# Patient Record
Sex: FEMALE | Race: WHITE | NOT HISPANIC OR LATINO | Employment: OTHER | URBAN - METROPOLITAN AREA
[De-identification: names, ages, dates, MRNs, and addresses within clinical notes are randomized per-mention and may not be internally consistent; named-entity substitution may affect disease eponyms.]

---

## 2018-08-17 PROBLEM — I20.9 ANGINA, CLASS III: Status: ACTIVE | Noted: 2018-08-17

## 2018-08-17 PROBLEM — R94.39 ABNORMAL MYOCARDIAL PERFUSION STUDY: Status: ACTIVE | Noted: 2018-08-17

## 2022-05-23 PROBLEM — I25.10 CAD (CORONARY ARTERY DISEASE): Status: ACTIVE | Noted: 2022-05-23

## 2022-05-23 PROBLEM — E11.9 DM TYPE 2 (DIABETES MELLITUS, TYPE 2): Status: ACTIVE | Noted: 2022-05-23

## 2022-05-23 PROBLEM — E78.5 DYSLIPIDEMIA: Status: ACTIVE | Noted: 2022-05-23

## 2022-05-23 PROBLEM — R93.1 ELEVATED CORONARY ARTERY CALCIUM SCORE: Status: ACTIVE | Noted: 2022-05-23

## 2023-12-01 ENCOUNTER — TELEPHONE (OUTPATIENT)
Dept: UROGYNECOLOGY | Facility: CLINIC | Age: 63
End: 2023-12-01
Payer: COMMERCIAL

## 2024-01-11 ENCOUNTER — PATIENT MESSAGE (OUTPATIENT)
Dept: UROGYNECOLOGY | Facility: CLINIC | Age: 64
End: 2024-01-11

## 2024-01-11 ENCOUNTER — OFFICE VISIT (OUTPATIENT)
Dept: UROGYNECOLOGY | Facility: CLINIC | Age: 64
End: 2024-01-11
Payer: MEDICARE

## 2024-01-11 VITALS
WEIGHT: 149.06 LBS | DIASTOLIC BLOOD PRESSURE: 62 MMHG | BODY MASS INDEX: 23.34 KG/M2 | SYSTOLIC BLOOD PRESSURE: 124 MMHG

## 2024-01-11 DIAGNOSIS — N81.11 CYSTOCELE, MIDLINE: ICD-10-CM

## 2024-01-11 DIAGNOSIS — R15.9 FULL INCONTINENCE OF FECES: ICD-10-CM

## 2024-01-11 DIAGNOSIS — N99.3 VAGINAL VAULT PROLAPSE AFTER HYSTERECTOMY: ICD-10-CM

## 2024-01-11 DIAGNOSIS — Z87.440 HISTORY OF RECURRENT UTIS: ICD-10-CM

## 2024-01-11 DIAGNOSIS — M79.18 MYOFASCIAL PAIN: Primary | ICD-10-CM

## 2024-01-11 PROCEDURE — 1160F RVW MEDS BY RX/DR IN RCRD: CPT | Mod: CPTII,S$GLB,, | Performed by: OBSTETRICS & GYNECOLOGY

## 2024-01-11 PROCEDURE — 99999 PR PBB SHADOW E&M-EST. PATIENT-LVL V: CPT | Mod: PBBFAC,,, | Performed by: OBSTETRICS & GYNECOLOGY

## 2024-01-11 PROCEDURE — 4010F ACE/ARB THERAPY RXD/TAKEN: CPT | Mod: CPTII,S$GLB,, | Performed by: OBSTETRICS & GYNECOLOGY

## 2024-01-11 PROCEDURE — 99205 OFFICE O/P NEW HI 60 MIN: CPT | Mod: S$GLB,,, | Performed by: OBSTETRICS & GYNECOLOGY

## 2024-01-11 PROCEDURE — 3078F DIAST BP <80 MM HG: CPT | Mod: CPTII,S$GLB,, | Performed by: OBSTETRICS & GYNECOLOGY

## 2024-01-11 PROCEDURE — 1126F AMNT PAIN NOTED NONE PRSNT: CPT | Mod: CPTII,S$GLB,, | Performed by: OBSTETRICS & GYNECOLOGY

## 2024-01-11 PROCEDURE — 1159F MED LIST DOCD IN RCRD: CPT | Mod: CPTII,S$GLB,, | Performed by: OBSTETRICS & GYNECOLOGY

## 2024-01-11 PROCEDURE — 3074F SYST BP LT 130 MM HG: CPT | Mod: CPTII,S$GLB,, | Performed by: OBSTETRICS & GYNECOLOGY

## 2024-01-11 PROCEDURE — 3008F BODY MASS INDEX DOCD: CPT | Mod: CPTII,S$GLB,, | Performed by: OBSTETRICS & GYNECOLOGY

## 2024-01-11 RX ORDER — FLUOCINOLONE ACETONIDE 0.11 MG/ML
OIL AURICULAR (OTIC)
COMMUNITY
Start: 2023-10-03

## 2024-01-11 RX ORDER — TURMERIC 400 MG
CAPSULE ORAL
COMMUNITY
Start: 2016-11-08

## 2024-01-11 RX ORDER — AMOXICILLIN 500 MG/1
CAPSULE ORAL
COMMUNITY
Start: 2023-07-31

## 2024-01-11 RX ORDER — ESTRADIOL 0.1 MG/G
CREAM VAGINAL
COMMUNITY
Start: 2024-01-04

## 2024-01-11 RX ORDER — ROSUVASTATIN CALCIUM 10 MG/1
10 TABLET, COATED ORAL
COMMUNITY

## 2024-01-11 RX ORDER — CYCLOSPORINE 0.5 MG/ML
EMULSION OPHTHALMIC
COMMUNITY
Start: 2023-10-31

## 2024-01-11 RX ORDER — EMPAGLIFLOZIN 10 MG/1
TABLET, FILM COATED ORAL
COMMUNITY
Start: 2023-06-08

## 2024-01-11 RX ORDER — ACETAMINOPHEN AND PHENYLEPHRINE HCL 325; 5 MG/1; MG/1
1 TABLET ORAL DAILY
COMMUNITY
Start: 2021-10-19

## 2024-01-11 RX ORDER — SEMAGLUTIDE 1.34 MG/ML
1 INJECTION, SOLUTION SUBCUTANEOUS
COMMUNITY
Start: 2023-12-18

## 2024-01-11 RX ORDER — LOSARTAN POTASSIUM 25 MG/1
25 TABLET ORAL
COMMUNITY
Start: 2024-01-03

## 2024-01-11 RX ORDER — HYDROCODONE BITARTRATE AND ACETAMINOPHEN 5; 325 MG/1; MG/1
1 TABLET ORAL EVERY 6 HOURS PRN
COMMUNITY
Start: 2023-07-31

## 2024-01-11 NOTE — PROGRESS NOTES
"Chief Complaint   Patient presents with    Vaginal Prolapse    Encopresis     Pelvic pressure         HPI: Patient is a 63 y.o. female  who presents today stating that she had not been feeling well. She states that her family members all had early hysterectomies and prolapse repair procedures. She states that her gyn informed her that "things are falling".     She reports that she has an intravaginal fullness but also dryness. She had a hysterectomy at 23 years due to prolapse. She states that she feels that something is sharp. She denies a vaginal bulge at the vaginal opening. She feels different.     She reports that she occasionally has had a bladder infection and was recently started vaginal estrogen cream.   She denies urinary urgency. She denies urinary frequency. She has on occasion if she has waited too long leaked urine before reaching the bathroom. She wakes 0-3 times per night depending on what time she goes to bed. She drinks fluid up to bedtime. She denies enuresis. She denies loss of urine with coughing, sneezing, or laughing.     She reports accidental bowel leakage. Symptoms have been occurring for about 8 months. She states that even upon changing position from sitting to standing or if she bend forward she will leak stool. She also notices that with raising her voice she may leak stool. She reports that the consistency is either formed or liquid. Her most recently colonoscopy was 2023 and she was noted to have polyps but they were removed by Dr. Hui. She was embarrassed to discuss with her GI.     Patient reports having two vaginal births. She reports requiring forceps delivery for at least her first delivery. She does recall having an episiotomy for both pregnancies. Does not believe that she had a third or fourth degree laceration.      Review of Systems   Constitutional:  Negative for activity change, appetite change, chills, fatigue, fever and unexpected weight change.   HENT:  " Negative for nasal congestion, dental problem, hearing loss, mouth dryness and sore throat.    Eyes:  Positive for eye dryness. Negative for pain.   Respiratory:  Negative for cough, shortness of breath and wheezing.    Cardiovascular:  Negative for chest pain, palpitations and leg swelling.   Gastrointestinal:  Negative for abdominal distention, abdominal pain, blood in stool, constipation and rectal pain.   Endocrine: Negative for cold intolerance and heat intolerance.   Genitourinary:  Positive for vaginal dryness. Negative for dyspareunia and hot flashes.        Decreased libido   Musculoskeletal:  Negative for arthralgias, back pain, gait problem, joint swelling, myalgias, neck pain and neck stiffness.   Integumentary:  Negative for rash.   Neurological:  Negative for dizziness, tremors, seizures, speech difficulty, weakness, light-headedness, numbness and headaches.   Psychiatric/Behavioral:  Negative for confusion, dysphoric mood and sleep disturbance. The patient is nervous/anxious.         Past Medical History:   Diagnosis Date    Anxiety and depression     Arthritis     Bell's palsy     Carpal tunnel syndrome of right wrist     Coronary artery disease     Diabetes mellitus     Diverticulitis     GERD (gastroesophageal reflux disease)     Hemorrhoids     Hernia of abdominal wall     History of 2019 novel coronavirus disease (COVID-19) 02/22/2022    X 2 INFECTIONS    History of shingles     HLD (hyperlipidemia)     Hx of colonic polyps     Hx of migraines     Leaky heart valve     Liver disease     CYST ON LIVER    Mesenteric lymphadenitis     Multiple gastric ulcers     Neuropathy     PONV (postoperative nausea and vomiting)     Psoriasis     Shingles     UTI (urinary tract infection)        Past Surgical History:   Procedure Laterality Date    BREAST SURGERY      benign tumor removal    COLONOSCOPY W/ POLYPECTOMY      CORONARY ANGIOGRAPHY N/A 8/17/2018    Procedure: Coronary angiography;  Surgeon: Marcelo  MD Lesvia;  Location: [facility] CATH;  Service: Cardiovascular;  Laterality: N/A;    CYSTOSCOPY      HYSTERECTOMY      LEFT HEART CATHETERIZATION Left 5/23/2022    Procedure: Left heart cath;  Surgeon: Marcelo Lakhani MD;  Location: [facility] CATH;  Service: Cardiology;  Laterality: Left;         Current Outpatient Medications:     biotin 10,000 mcg Cap, Take 1 tablet by mouth once daily., Disp: , Rfl:     busPIRone (BUSPAR) 15 MG tablet, Take 15 mg by mouth daily as needed., Disp: , Rfl:     clopidogreL (PLAVIX) 75 mg tablet, Take 75 mg by mouth once daily., Disp: , Rfl:     coenzyme Q10 100 mg capsule, Take 100 mg by mouth 2 (two) times daily., Disp: , Rfl:     cyanocobalamin, vitamin B-12, 1,500 mcg TbDL, Take 1,500 mg by mouth once daily., Disp: , Rfl:     estradioL (ESTRACE) 0.01 % (0.1 mg/gram) vaginal cream, Place vaginally., Disp: , Rfl:     famotidine (PEPCID) 20 MG tablet, Take 20 mg by mouth every other day., Disp: , Rfl:     fluocinolone acetonide oiL 0.01 % Drop, instill 5 DROPS into affected ear(s) daily, Disp: , Rfl:     gabapentin (GRALISE) 600 mg Tb24, Take 1 tablet by mouth every evening., Disp: , Rfl:     hydrochlorothiazide (HYDRODIURIL) 12.5 MG Tab, Take 12.5 mg by mouth once daily., Disp: , Rfl:     JARDIANCE 10 mg tablet, , Disp: , Rfl:     losartan (COZAAR) 25 MG tablet, Take 25 mg by mouth., Disp: , Rfl:     metFORMIN (GLUCOPHAGE) 1000 MG tablet, Take 1 tablet (1,000 mg total) by mouth 2 (two) times daily with meals., Disp: , Rfl:     OZEMPIC 1 mg/dose (4 mg/3 mL), Inject 1 mg into the skin every 7 days., Disp: , Rfl:     RABEprazole (ACIPHEX) 20 mg tablet, Take 20 mg by mouth once daily., Disp: , Rfl:     RESTASIS 0.05 % ophthalmic emulsion, INSTILL 1 drop into AFFECTED eye EVERY TWELVE HOURS, Disp: , Rfl:     rosuvastatin (CRESTOR) 10 MG tablet, Take 10 mg by mouth., Disp: , Rfl:     turmeric 400 mg Cap, , Disp: , Rfl:     amoxicillin (AMOXIL) 500 MG capsule, TAKE ONE CAPSULE BY MOUTH THREE TIMES DAILY  UNTIL COMPLETE, Disp: , Rfl:     Bifidobacterium infantis 10.5 mg (10 million cell) Chew, Take 1 tablet by mouth every other day., Disp: , Rfl:     calcium carbonate (OS-OTIS) 600 mg calcium (1,500 mg) Tab, Take 600 mg by mouth once daily., Disp: , Rfl:     HYDROcodone-acetaminophen (NORCO) 5-325 mg per tablet, Take 1 tablet by mouth every 6 (six) hours as needed., Disp: , Rfl:     sucralfate (CARAFATE) 1 gram tablet, Take 1 g by mouth once daily., Disp: , Rfl:     Review of patient's allergies indicates:   Allergen Reactions    Tramadol Nausea Only    Atorvastatin        Family History   Problem Relation Age of Onset    Atrial fibrillation Mother     Kidney cancer Mother     Aortic aneurysm Father     Heart disease Father     Diabetes Father     Lung cancer Father     Prostate cancer Father     Heart disease Brother     Heart failure Brother        Social History     Socioeconomic History    Marital status:    Tobacco Use    Smoking status: Former     Current packs/day: 0.00     Types: Cigarettes     Quit date:      Years since quitting: 10.0    Smokeless tobacco: Never   Substance and Sexual Activity    Alcohol use: No    Drug use: Never    Sexual activity: Yes     Partners: Male   Social History Narrative    Lives with spouse. Feels safe in her home.        OB History          2    Para   2    Term   1       1    AB   0    Living   2         SAB        IAB        Ectopic        Multiple        Live Births               Obstetric Comments    x 2  Forceps x 1               Gyn History    Mammogram: 23 BIRADS 1, negative  Pap smear: HPV negative  LMP: No LMP recorded. Patient has had a hysterectomy.   Postmenopausal bleeding: n/a      INITIAL PHYSICAL EXAMINATION    Vitals:    24 1313   BP: 124/62      General: Healthy in appearance, Well nourished, Affect Normal, NAD.  Neck: Supple, No masses, Trachea midline, Thyroid normal size,  non-tender, no masses or nodules.  Nodes: No  clavicular/cervical adenopathy.  Skin: Normal temperature, No atypical lesions or rash.  Heart: Normal sounds, no murmurs  Lungs: Normal respiratory effort.  Gastrointestinal: Non tender, Non distended, No masses, guarding or  rebound, No hepatosplenomegally, No hernia.  Ext: No clubbing, cyanosis, edema or varicosities.  DTR's: 2+ bilaterally  Strength 5/5 bilateral upper and lower extremities    Genitourinary-  Vulva: normal without lesions, masses, atrophy or pain  Urethra: meatus central and normal in appearance, no prolapse/caruncle, no masses or discharge. Empty supine stress test was negative.  Bladder: non-tender, no masses  Vagina: No discharge or lesions, severe atrophy, no masses appreciated.  [See POP-Q]  Cervix: absent  Uterus:  absent  Adnexa: no masses, non tender.  Rectal: decreased tone and anorectal angle, concentric muscle squeeze, possible small amount of rectal prolapse noted  Neuro: S2-4- pin prick and light touch intact, Anal wink present  Levator strength: 1/5  Levator tenderness: left 6?/10 and right 7/10    POP-Q Exam- pelvic organ prolapse quantitative    Aa +1  Anterior Wall Ba +1  Anterior wall C -3  Cervix or cuff   Gh 5.5  Genital hiatus pb 4  perineal body tvl 7.5  Total vaginal length   Ap -2  Posterior wall Bp -2  Posterior wall D n/a  Posterior fornix     without Uterus    POP-Q Stage:2    No visits with results within 1 Day(s) from this visit.   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 Reviewed with patient the exam findings including slightly decreased anal tone and possible rectal prolapse. Exam findings of myofascial pain and vaginal atrophy also reviewed.   Options for treatment of her vaginal prolapse were reviewed. We discussed both surgical and nonsurgical management options including pessary use, pelvic floor rehabilitation, and reconstructive surgery.     Discussed that pelvic floor rehabilitation can help address the myofascial pain as well as potentially help the accidental bowel leakage. We also discussed using psyllium husk to bulk her stools and see if this will help with the bowel incontinence. I have also referred the patient to colorectal surgery to assess for potential rectal prolapse and for possible additional testing. Reviewed that if surgical correction is needed then we can do a combined procedure to correct the vaginal prolapse.     For her UTI's we discussed how vaginal estrogen cream helps reduce the number of infections when used over several month. In the interim we reviewed other supplements that can be helpful including D-Mannose, Ellura (high concentrated cranberry), and probiotics with lactobacilli crispatus (Lady Bugs).   Patient will return on the same day as her CRS appointment so that we may review symptoms and coordinate her care.     All questions were answered today. The patient was encouraged to contact the office as needed with any additional questions or concerns.     Total time spent on visit was 60 minutes.  This includes face to face time and non-face to face time preparing to see the patient (eg, review of tests), Obtaining and/or reviewing separately obtained history, Documenting clinical information in the electronic or other health record, Independently  interpreting resultsand communicating results to the patient/family/caregiver, or Care coordination.    Genny Glasgow MD